# Patient Record
Sex: FEMALE | URBAN - METROPOLITAN AREA
[De-identification: names, ages, dates, MRNs, and addresses within clinical notes are randomized per-mention and may not be internally consistent; named-entity substitution may affect disease eponyms.]

---

## 2023-11-21 ENCOUNTER — APPOINTMENT (OUTPATIENT)
Dept: URBAN - METROPOLITAN AREA CLINIC 211 | Age: 48
Setting detail: DERMATOLOGY
End: 2023-11-21

## 2023-11-21 ENCOUNTER — RX ONLY (RX ONLY)
Age: 48
End: 2023-11-21

## 2023-11-21 DIAGNOSIS — Z71.89 OTHER SPECIFIED COUNSELING: ICD-10-CM

## 2023-11-21 DIAGNOSIS — D18.0 HEMANGIOMA: ICD-10-CM

## 2023-11-21 DIAGNOSIS — D22 MELANOCYTIC NEVI: ICD-10-CM

## 2023-11-21 DIAGNOSIS — L82.1 OTHER SEBORRHEIC KERATOSIS: ICD-10-CM

## 2023-11-21 DIAGNOSIS — Z12.83 ENCOUNTER FOR SCREENING FOR MALIGNANT NEOPLASM OF SKIN: ICD-10-CM

## 2023-11-21 DIAGNOSIS — L71.0 PERIORAL DERMATITIS: ICD-10-CM

## 2023-11-21 DIAGNOSIS — L81.4 OTHER MELANIN HYPERPIGMENTATION: ICD-10-CM

## 2023-11-21 PROBLEM — D23.62 OTHER BENIGN NEOPLASM OF SKIN OF LEFT UPPER LIMB, INCLUDING SHOULDER: Status: ACTIVE | Noted: 2023-11-21

## 2023-11-21 PROBLEM — D18.01 HEMANGIOMA OF SKIN AND SUBCUTANEOUS TISSUE: Status: ACTIVE | Noted: 2023-11-21

## 2023-11-21 PROBLEM — D22.5 MELANOCYTIC NEVI OF TRUNK: Status: ACTIVE | Noted: 2023-11-21

## 2023-11-21 PROCEDURE — OTHER PRESCRIPTION MEDICATION MANAGEMENT: OTHER

## 2023-11-21 PROCEDURE — OTHER MIPS QUALITY: OTHER

## 2023-11-21 PROCEDURE — 99204 OFFICE O/P NEW MOD 45 MIN: CPT

## 2023-11-21 PROCEDURE — OTHER SUNSCREEN RECOMMENDATIONS: OTHER

## 2023-11-21 PROCEDURE — OTHER COUNSELING: OTHER

## 2023-11-21 PROCEDURE — OTHER REASSURANCE: OTHER

## 2023-11-21 PROCEDURE — OTHER PRESCRIPTION: OTHER

## 2023-11-21 RX ORDER — SODIUM SULFACETAMIDE AND SULFUR 80; 40 MG/ML; MG/ML
LOTION TOPICAL
Qty: 473 | Refills: 2 | Status: ERX | COMMUNITY
Start: 2023-11-21

## 2023-11-21 RX ORDER — ERYTHROMYCIN 5 MG/G
OINTMENT OPHTHALMIC
Qty: 2 | Refills: 0 | Status: ERX | COMMUNITY
Start: 2023-11-21

## 2023-11-21 RX ORDER — ERYTHROMYCIN 5 MG/G
OINTMENT OPHTHALMIC
Qty: 2 | Refills: 0 | Status: ERX

## 2023-11-21 ASSESSMENT — LOCATION DETAILED DESCRIPTION DERM
LOCATION DETAILED: LEFT UPPER CUTANEOUS LIP
LOCATION DETAILED: RIGHT SUPERIOR CENTRAL MALAR CHEEK
LOCATION DETAILED: LEFT MEDIAL INFERIOR CHEST
LOCATION DETAILED: LEFT SUPERIOR MEDIAL MALAR CHEEK
LOCATION DETAILED: EPIGASTRIC SKIN
LOCATION DETAILED: RIGHT INFERIOR MEDIAL MALAR CHEEK
LOCATION DETAILED: RIGHT MEDIAL BUCCAL CHEEK
LOCATION DETAILED: LEFT LOWER CUTANEOUS LIP

## 2023-11-21 ASSESSMENT — LOCATION ZONE DERM
LOCATION ZONE: TRUNK
LOCATION ZONE: LIP
LOCATION ZONE: FACE

## 2023-11-21 ASSESSMENT — LOCATION SIMPLE DESCRIPTION DERM
LOCATION SIMPLE: LEFT CHEEK
LOCATION SIMPLE: CHEST
LOCATION SIMPLE: LEFT LIP
LOCATION SIMPLE: RIGHT CHEEK
LOCATION SIMPLE: ABDOMEN

## 2023-11-21 NOTE — PROCEDURE: PRESCRIPTION MEDICATION MANAGEMENT
Samples Given: Zilxi (minocycline)
Detail Level: Zone
Plan: 1. Patient has allergy to doxycycline and penicillin/amoxicillin. Start SulfaCleanse 8-4 8 %-4 % topical suspension -Cleanse to perioral affected areas face nightly\\n\\n2. Start erythromycin 5 mg/gram (0.5 %) eye ointment -Apply to AA twice daily
Render In Strict Bullet Format?: Yes